# Patient Record
Sex: MALE | Race: OTHER | NOT HISPANIC OR LATINO
[De-identification: names, ages, dates, MRNs, and addresses within clinical notes are randomized per-mention and may not be internally consistent; named-entity substitution may affect disease eponyms.]

---

## 2019-04-20 ENCOUNTER — TRANSCRIPTION ENCOUNTER (OUTPATIENT)
Age: 24
End: 2019-04-20

## 2024-01-29 ENCOUNTER — APPOINTMENT (OUTPATIENT)
Dept: PAIN MANAGEMENT | Facility: CLINIC | Age: 29
End: 2024-01-29
Payer: OTHER MISCELLANEOUS

## 2024-01-29 DIAGNOSIS — Z78.9 OTHER SPECIFIED HEALTH STATUS: ICD-10-CM

## 2024-01-29 DIAGNOSIS — M54.16 RADICULOPATHY, LUMBAR REGION: ICD-10-CM

## 2024-01-29 PROBLEM — Z00.00 ENCOUNTER FOR PREVENTIVE HEALTH EXAMINATION: Status: ACTIVE | Noted: 2024-01-29

## 2024-01-29 PROCEDURE — 99204 OFFICE O/P NEW MOD 45 MIN: CPT

## 2024-01-29 RX ORDER — MELOXICAM 15 MG/1
15 TABLET ORAL DAILY
Qty: 30 | Refills: 0 | Status: ACTIVE | COMMUNITY
Start: 2024-01-29 | End: 1900-01-01

## 2024-01-29 NOTE — HISTORY OF PRESENT ILLNESS
[FreeTextEntry1] : HISTORY OF PRESENT ILLNESS: Mr. Davis is a 29-year-old male complaining of lower back and neck pain. The pain started after an injury at work on 01-. He is a  with the [employer] authority, he was at work when he slipped on a curb and hurt his left lower back and the pain is described as shooting into his left posterior thigh and leg. He notes that with a deep breath causing severe pain.  Patient describes the pain as moderate to severe.   He presents with neck pain that is dull/achy, he denies radiation. He rates the pain an 8/10 on the pain scale today. Cervical extension and lateral rotation improve the pain, cervical flexion worsens the pain. Patient denies bowel/bladder incontinence, weakness, falls, tingling, numbness.  Patient denies any bowel or bladder dysfunction, incontinence, or saddle anesthesia.  ACTIVITIES: Patient uses no assisted walking device at this time.  Patient has difficulty performing household chores, going to work, doing yardwork or shopping, participating in recreational activities and exercise at this time.

## 2024-01-29 NOTE — DISCUSSION/SUMMARY
[de-identified] : A discussion regarding available pain management treatment options occurred with the patient. These included interventional, rehabilitative, pharmacological, and alternative modalities. We will proceed with the following:   Rehabilitative options: -Initiate Physical therapy of the cervical and lumbar spine 2-3 times a week for 4-8 weeks stressing a home exercise program of walking, shoulder griddle strengthening, swimming, elliptical , recumbent bike, Saud chi and Yoga. Use things that heat like hot shower or icy heat before rehab, exercising and at the beginning of the day, and ice (ice in a bag never directly on the skin) after activity and at the end of the day.  Medication based treatment options: - ordered Meloxicam 15mg daily for 4 weeks. Discussed risks and benefits. Avoid taking for any side effects. patient is aware to take for 2 weeks straight than take 1 week off before repeating.   Complementary treatment options: - Patient was advised to stay away from any heavy lifting. If needed, she was advised to squat and not bend forward.  Follow up in 4 weeks for reassessment.   I, Penelope Sheppard, attest that this documentation has been prepared under the direction and in the presence of Provider Wayne Ponce, DO The documentation recorded by the scribe, in my presence, accurately reflects the service I personally performed, and the decisions made by me with my edits as appropriate.  Best Regards, Wayne Ponce D.O.

## 2024-02-26 ENCOUNTER — APPOINTMENT (OUTPATIENT)
Dept: PAIN MANAGEMENT | Facility: CLINIC | Age: 29
End: 2024-02-26
Payer: OTHER MISCELLANEOUS

## 2024-02-26 DIAGNOSIS — M54.2 CERVICALGIA: ICD-10-CM

## 2024-02-26 PROCEDURE — 99213 OFFICE O/P EST LOW 20 MIN: CPT

## 2024-02-26 NOTE — DISCUSSION/SUMMARY
[de-identified] : A discussion regarding available pain management treatment options occurred with the patient. These included interventional, rehabilitative, pharmacological, and alternative modalities. We will proceed with the following:   Interventional treatment options:  - scheduled for an MRI this upcoming Saturday.  - X-ray of the left shoulder ordered due to pain and decrease in range of motion in that area to delineate a pain generator.  Rehabilitative options: - Initiate Physical therapy of the cervical and lumbar spine 2-3 times a week for 4-8 weeks stressing a home exercise program of walking, shoulder griddle strengthening, swimming, elliptical , recumbent bike, Saud chi and Yoga. Use things that heat like hot shower or icy heat before rehab, exercising and at the beginning of the day, and ice (ice in a bag never directly on the skin) after activity and at the end of the day.  Medication based treatment options: c/w mobic prn   Complementary treatment options: - Patient was advised to stay away from any heavy lifting. If needed, she was advised to squat and not bend forward.  Follow up in 4 weeks for reassessment.   I, Penelope Sheppard, attest that this documentation has been prepared under the direction and in the presence of Provider Wayne Ponce, DO The documentation recorded by the scribe, in my presence, accurately reflects the service I personally performed, and the decisions made by me with my edits as appropriate.  Best Regards, Wayne Ponce D.O.

## 2024-02-26 NOTE — HISTORY OF PRESENT ILLNESS
[FreeTextEntry1] : HISTORY OF PRESENT ILLNESS: Mr. Davis is a 29-year-old male complaining of lower back and neck pain. The pain started after an injury at work on 01-. He is a  with the [employer] authority, he was at work when he slipped on a curb and hurt his left lower back and the pain is described as shooting into his left posterior thigh and leg. He notes that with a deep breath causing severe pain.  Patient describes the pain as moderate to severe.   He presents with neck pain that is dull/achy, he denies radiation. He rates the pain an 8/10 on the pain scale today. Cervical extension and lateral rotation improve the pain, cervical flexion worsens the pain. Patient denies bowel/bladder incontinence, weakness, falls, tingling, numbness.  Patient denies any bowel or bladder dysfunction, incontinence, or saddle anesthesia.  ACTIVITIES: Patient uses no assisted walking device at this time.  Patient has difficulty performing household chores, going to work, doing yardwork or shopping, participating in recreational activities and exercise at this time.   PRESENTING TODAY 02-: Patient presents to the office today for a follow up visit with continued lower back and neck pain. His neck and low back pain has been improving with the meloxicam and has been less frequent. Pain only occurs when working overtime.   Patient admits to left dull, achy shoulder pain with overhead activity without associated numbness. Patient also has pain with laying on his affected side. Pain is 8/10 in intensity. He has tried NSAIDs without significant relief.

## 2024-04-05 ENCOUNTER — APPOINTMENT (OUTPATIENT)
Dept: PAIN MANAGEMENT | Facility: CLINIC | Age: 29
End: 2024-04-05

## 2024-05-02 ENCOUNTER — APPOINTMENT (OUTPATIENT)
Dept: PAIN MANAGEMENT | Facility: CLINIC | Age: 29
End: 2024-05-02
Payer: OTHER MISCELLANEOUS

## 2024-05-02 DIAGNOSIS — M25.512 PAIN IN LEFT SHOULDER: ICD-10-CM

## 2024-05-02 DIAGNOSIS — M54.50 LOW BACK PAIN, UNSPECIFIED: ICD-10-CM

## 2024-05-02 PROCEDURE — 99213 OFFICE O/P EST LOW 20 MIN: CPT

## 2024-05-02 NOTE — DISCUSSION/SUMMARY
[de-identified] : A discussion regarding available pain management treatment options occurred with the patient. These included interventional, rehabilitative, pharmacological, and alternative modalities. We will proceed with the following:   Interventional treatment options:  1. MRI of the left shoulder ordered to rule out any internal derangement.   Medication based treatment options: - c/w Meloxicam 15mg daily for 4 weeks. Discussed risks and benefits. Avoid taking for any side effects. -Patient is aware to take for 2 weeks straight than take 1 week off before repeating.   Complementary treatment options: - Patient was advised to stay away from any heavy lifting. If needed, he was advised to squat and not bend forward.  Follow up after imaging studies for further recommendations  I, Penelope Sheppard, attest that this documentation has been prepared under the direction and in the presence of Provider Wayne Ponce, DO The documentation recorded by the scribe, in my presence, accurately reflects the service I personally performed, and the decisions made by me with my edits as appropriate.  Best Regards, Wayne Ponce D.O.

## 2024-05-02 NOTE — HISTORY OF PRESENT ILLNESS
[FreeTextEntry1] : HISTORY OF PRESENT ILLNESS: Mr. Davis is a 29-year-old male complaining of lower back and neck pain. The pain started after an injury at work on 01-. He is a  with the [employer] authority, he was at work when he slipped on a curb and hurt his left lower back and the pain is described as shooting into his left posterior thigh and leg. He notes that with a deep breath causing severe pain.  Patient describes the pain as moderate to severe.   He presents with neck pain that is dull/achy, he denies radiation. He rates the pain an 8/10 on the pain scale today. Cervical extension and lateral rotation improve the pain, cervical flexion worsens the pain. Patient denies bowel/bladder incontinence, weakness, falls, tingling, numbness. Patient denies any bowel or bladder dysfunction, incontinence, or saddle anesthesia.  PRESENTING TODAY 05-: Patient presents to the office today for a follow up visit with continued lower back pain which he sustained on 01-. His neck and low back pain has been improving with the meloxicam and has been less frequent. Pain only occurs when working overtime. We reviewed the MRI of his lumbar spine in detail during today's visit.   Patient continues with left dull, achy shoulder pain with overhead activity without associated numbness. Patient also has pain with laying on his affected side. Pain is 8/10 in intensity. He has tried NSAIDs without significant relief. He has done more than 6 weeks of provider guided home exercises, 3/4x per week, with minimal to no relief. Patient denies any bowel or bladder dysfunction, incontinence, or saddle anesthesia.

## 2024-10-31 ENCOUNTER — APPOINTMENT (OUTPATIENT)
Dept: PAIN MANAGEMENT | Facility: CLINIC | Age: 29
End: 2024-10-31
Payer: OTHER MISCELLANEOUS

## 2024-10-31 DIAGNOSIS — M75.52 BURSITIS OF LEFT SHOULDER: ICD-10-CM

## 2024-10-31 DIAGNOSIS — M25.512 PAIN IN LEFT SHOULDER: ICD-10-CM

## 2024-10-31 PROCEDURE — 99214 OFFICE O/P EST MOD 30 MIN: CPT

## 2024-11-01 PROBLEM — M75.52 SUBDELTOID BURSITIS OF LEFT SHOULDER JOINT: Status: ACTIVE | Noted: 2024-11-01

## 2025-02-10 ENCOUNTER — APPOINTMENT (OUTPATIENT)
Dept: PAIN MANAGEMENT | Facility: CLINIC | Age: 30
End: 2025-02-10
Payer: OTHER MISCELLANEOUS

## 2025-02-10 DIAGNOSIS — M75.52 BURSITIS OF LEFT SHOULDER: ICD-10-CM

## 2025-02-10 DIAGNOSIS — M25.512 PAIN IN LEFT SHOULDER: ICD-10-CM

## 2025-02-10 PROCEDURE — 99214 OFFICE O/P EST MOD 30 MIN: CPT

## 2025-03-10 ENCOUNTER — APPOINTMENT (OUTPATIENT)
Dept: ORTHOPEDIC SURGERY | Facility: CLINIC | Age: 30
End: 2025-03-10
Payer: OTHER MISCELLANEOUS

## 2025-03-10 DIAGNOSIS — S43.432A SUPERIOR GLENOID LABRUM LESION OF LEFT SHOULDER, INITIAL ENCOUNTER: ICD-10-CM

## 2025-03-10 PROCEDURE — 99204 OFFICE O/P NEW MOD 45 MIN: CPT

## 2025-03-10 PROCEDURE — 73030 X-RAY EXAM OF SHOULDER: CPT | Mod: LT

## 2025-03-13 ENCOUNTER — NON-APPOINTMENT (OUTPATIENT)
Age: 30
End: 2025-03-13

## 2025-03-15 ENCOUNTER — RX RENEWAL (OUTPATIENT)
Age: 30
End: 2025-03-15

## 2025-04-03 ENCOUNTER — APPOINTMENT (OUTPATIENT)
Dept: ORTHOPEDIC SURGERY | Facility: CLINIC | Age: 30
End: 2025-04-03
Payer: OTHER MISCELLANEOUS

## 2025-04-03 ENCOUNTER — TRANSCRIPTION ENCOUNTER (OUTPATIENT)
Age: 30
End: 2025-04-03

## 2025-04-03 DIAGNOSIS — S43.432A SUPERIOR GLENOID LABRUM LESION OF LEFT SHOULDER, INITIAL ENCOUNTER: ICD-10-CM

## 2025-04-03 PROCEDURE — 99245 OFF/OP CONSLTJ NEW/EST HI 55: CPT

## 2025-05-19 ENCOUNTER — APPOINTMENT (OUTPATIENT)
Dept: PAIN MANAGEMENT | Facility: CLINIC | Age: 30
End: 2025-05-19
Payer: OTHER MISCELLANEOUS

## 2025-05-19 DIAGNOSIS — M54.50 LOW BACK PAIN, UNSPECIFIED: ICD-10-CM

## 2025-05-19 PROCEDURE — 99213 OFFICE O/P EST LOW 20 MIN: CPT

## 2025-06-03 ENCOUNTER — APPOINTMENT (OUTPATIENT)
Dept: NEUROLOGY | Facility: CLINIC | Age: 30
End: 2025-06-03

## 2025-06-13 ENCOUNTER — APPOINTMENT (OUTPATIENT)
Dept: PAIN MANAGEMENT | Facility: CLINIC | Age: 30
End: 2025-06-13